# Patient Record
Sex: FEMALE | Race: BLACK OR AFRICAN AMERICAN | URBAN - METROPOLITAN AREA
[De-identification: names, ages, dates, MRNs, and addresses within clinical notes are randomized per-mention and may not be internally consistent; named-entity substitution may affect disease eponyms.]

---

## 2018-07-09 ENCOUNTER — HOSPITAL ENCOUNTER (EMERGENCY)
Facility: CLINIC | Age: 38
Discharge: HOME | End: 2018-07-09
Attending: PHYSICIAN ASSISTANT | Admitting: PHYSICIAN ASSISTANT
Payer: MEDICAID

## 2018-07-09 VITALS
WEIGHT: 167 LBS | HEART RATE: 69 BPM | SYSTOLIC BLOOD PRESSURE: 106 MMHG | OXYGEN SATURATION: 99 % | HEIGHT: 72 IN | TEMPERATURE: 98.1 F | DIASTOLIC BLOOD PRESSURE: 61 MMHG | BODY MASS INDEX: 22.62 KG/M2 | RESPIRATION RATE: 16 BRPM

## 2018-07-09 DIAGNOSIS — T16.1XXA FOREIGN BODY OF RIGHT EAR, INITIAL ENCOUNTER: ICD-10-CM

## 2018-07-09 PROCEDURE — 99282 EMERGENCY DEPT VISIT SF MDM: CPT

## 2018-07-09 NOTE — ED AVS SNAPSHOT
Emergency Department    [address] [facility] [address]    Phone:  [phone]    Fax:  [phone]                                       Chantal Mustafa   MRN: [id number]    Department:   Emergency Department   Date of Visit:  7/9/2018           Patient Information     Date Of Birth          1980        Your diagnoses for this visit were:     Foreign body of right ear, initial encounter        You were seen by Keshia Lee PA-C.      Follow-up Information     Follow up with Fitz Serrato MD.    Specialty:  Family Practice    Why:  As needed    Contact information:    [facility]  [address]  [facility] [address]  [phone]          Follow up with  Emergency Department.    Specialty:  EMERGENCY MEDICINE    Why:  As needed, If symptoms worsen    Contact information:    [address] [facility] [id number] [phone]        Discharge Instructions         Anatomy of the Ear    The ear is a complex and delicate organ. It collects sound waves so you can hear the world around you. The ear also has a second function--it helps you keep your balance. Your ear can be divided into 3 parts. The outer ear and middle ear help collect and amplify sound. The inner ear converts sound waves to messages that are sent to the brain. The inner ear also senses the movement and position of your head and body so you can maintain your balance and see clearly, even when you change positions.  The mastoid bone surrounds the middle ear. The external ear collects sound waves. The ear canal carries sound waves to the eardrum. The eardrum vibrates from sound waves, setting the middle ear bones in motion. The middle ear bones (ossicles) vibrate, transmitting sound waves to the inner ear. When the ear is healthy, air pressure remains balanced in the middle ear. The eustachian tube helps control air pressure in the middle ear. The semicircular canals help maintain  balance. The vestibular nerve carries balance signals to the brain. The auditory nerve carries sound signals to the brain. The cochlea picks up sound waves and makes nerve signals.     Date Last Reviewed: 10/1/2016    9961-0543 [employer]. [address] [place] [address], [place] [address]. All rights reserved. This information is not intended as a substitute for professional medical care. Always follow your healthcare professional's instructions.          24 Hour Appointment Hotline       To make an appointment at any [facility], call [phone] ([phone]). If you don't have a family doctor or clinic, we will help you find one. [facility] are conveniently located to serve the needs of you and your family.             Review of your medicines      Our records show that you are taking the medicines listed below. If these are incorrect, please call your family doctor or clinic.        Dose / Directions Last dose taken    fluticasone 50 MCG/ACT spray   Commonly known as:  FLONASE   Dose:  1-2 spray   Quantity:  1 Package        Spray 1-2 sprays into both nostrils daily   Refills:  0        IBUPROFEN PO        Refills:  0        LORATADINE PO        Refills:  0        vitamin B complex with vitamin C Tabs tablet   Dose:  1 tablet        Take 1 tablet by mouth daily   Refills:  0        VITAMIN C PO        Refills:  0                Orders Needing Specimen Collection     None      Pending Results     No orders found from 7/7/2018 to 7/10/2018.            Pending Culture Results     No orders found from 7/7/2018 to 7/10/2018.            Pending Results Instructions     If you had any lab results that were not finalized at the time of your Discharge, you can call the ED Lab Result RN at [phone]. You will be contacted by this team for any positive Lab results or changes in treatment. The nurses are available 7 days a week from 10A to 6:30P.  You can leave a message 24 hours per day and they  will return your call.        Test Results From Your Hospital Stay               Clinical Quality Measure: Blood Pressure Screening     Your blood pressure was checked while you were in the emergency department today. The last reading we obtained was  BP: 107/64 . Please read the guidelines below about what these numbers mean and what you should do about them.  If your systolic blood pressure (the top number) is less than 120 and your diastolic blood pressure (the bottom number) is less than 80, then your blood pressure is normal. There is nothing more that you need to do about it.  If your systolic blood pressure (the top number) is 120-139 or your diastolic blood pressure (the bottom number) is 80-89, your blood pressure may be higher than it should be. You should have your blood pressure rechecked within a year by a primary care provider.  If your systolic blood pressure (the top number) is 140 or greater or your diastolic blood pressure (the bottom number) is 90 or greater, you may have high blood pressure. High blood pressure is treatable, but if left untreated over time it can put you at risk for heart attack, stroke, or kidney failure. You should have your blood pressure rechecked by a primary care provider within the next 4 weeks.  If your provider in the emergency department today gave you specific instructions to follow-up with your doctor or provider even sooner than that, you should follow that instruction and not wait for up to 4 weeks for your follow-up visit.        Thank you for choosing [facility]       Thank you for choosing [facility] for your care. Our goal is always to provide you with excellent care. Hearing back from our patients is one way we can continue to improve our services. Please take a few minutes to complete the written survey that you may receive in the mail after you visit with us. Thank you!        [facility] Information     [employer] lets you send messages to your doctor, view your test  "results, renew your prescriptions, schedule appointments and more. To sign up, go to [email or web address] . Click on \"Log in\" on the left side of the screen, which will take you to the Welcome page. Then click on \"Sign up Now\" on the right side of the page.     You will be asked to enter the access code listed below, as well as some personal information. Please follow the directions to create your username and password.     Your access code is: [email or web address]  Expires: 10/7/2018 10:23 PM     Your access code will  in 90 days. If you need help or a new code, please call your [facility] clinic or [phone].        Care EveryWhere ID     This is your Care EveryWhere ID. This could be used by other organizations to access your [facility] medical records  [id number]        Equal Access to Services     KAROL DEL TORO : Nat Sam, zoie terry, haylie barrios, tello davis . So [facility] [phone].    ATENCIÓN: Si habla español, tiene a villatoro disposición servicios gratuitos de asistencia lingüística. Llame al [phone].    We comply with applicable federal civil rights laws and [place] laws. We do not discriminate on the basis of race, color, national origin, age, disability, sex, sexual orientation, or gender identity.            After Visit Summary       This is your record. Keep this with you and show to your community pharmacist(s) and doctor(s) at your next visit.                  "

## 2018-07-09 NOTE — ED AVS SNAPSHOT
Emergency Department    [address] [facility] [address]    Phone:  [phone]    Fax:  [phone]                                       Chantal Mustafa   MRN: [id number]    Department:   Emergency Department   Date of Visit:  7/9/2018           After Visit Summary Signature Page     I have received my discharge instructions, and my questions have been answered. I have discussed any challenges I see with this plan with the nurse or doctor.    ..........................................................................................................................................  Patient/Patient Representative Signature      ..........................................................................................................................................  Patient Representative Print Name and Relationship to Patient    ..................................................               ................................................  Date                                            Time    ..........................................................................................................................................  Reviewed by Signature/Title    ...................................................              ..............................................  Date                                                            Time

## 2018-07-10 NOTE — ED NOTES
Pt's right ear irrigated by EDT.  Water flowed freely, no debris appeared to be present in the ear.

## 2018-07-10 NOTE — ED NOTES
Bed: FT04  Expected date:   Expected time:   Means of arrival:   Comments:  Foreign body in ear (qtip)\

## 2018-07-10 NOTE — ED PROVIDER NOTES
History     Chief Complaint:  Foreign Body in Ear (end of q-tip in right ear x 1 hour. )       HECTOR Mustafa is a 38 year old female who presents with concern for a foreign body in her right ear.  Patient reports prior to arrival she is using a Q-tip to clean her ear and the cotton came off the Q-tip and she suspects that it is left in her ear canal.  She denies hearing loss and no drainage from the ear.  She voices no other areas of concern..      Allergies:  Aspirin  Penicillins     Medications:      Ascorbic Acid (VITAMIN C PO)   fluticasone (FLONASE) 50 MCG/ACT nasal spray   IBUPROFEN PO   LORATADINE PO   vitamin  B complex with vitamin C (VITAMIN  B COMPLEX) TABS       Past Medical History:    History reviewed. No pertinent past medical history.    There are no active problems to display for this patient.       Past Surgical History:    History reviewed. No pertinent surgical history.     Family History:    family history is not on file.     Social History:   reports that she has never smoked. She does not have any smokeless tobacco history on file. She reports that she does not drink alcohol or use illicit drugs.    PCP: Fitz Serrato     Review of Systems   HENT: Negative for ear discharge, ear pain and hearing loss.      Physical Exam     Patient Vitals for the past 24 hrs:   BP Temp Temp src Pulse Resp SpO2 Height Weight   07/09/18 2147 107/64 98.1  F (36.7  C) Oral 69 16 98 % 1.829 m (6') 75.8 kg (167 lb)        General: Alert, interactive in no distress.   Head:  Scalp is atraumatic.  Eyes:  EOM intact. The pupils are equal, round, and reactive to light. No scleral icterus.  ENT:                                      Ears:  The external ears are normal. Bilateral TM's non-erythematous, non-bulging. External canals normal with no evidence of foreign body.   Nose:  The external nose is normal.  Throat:  The oropharynx is normal. Mucus membranes are moist.                 Skin:  Warm and dry, No rash  or lesions noted.  Psych:  Awake. Alert. Appropriate interactions.      Emergency Department Course     Emergency Department Course:  Past medical records, nursing notes, and vitals reviewed.  10:02 PM: I performed an exam of the patient and obtained history, as documented above.     Findings and plan explained to the patient. Patient discharged home with instructions regarding supportive care, medications, and reasons to return. The importance of close follow-up was reviewed.      Impression & Plan      Medical Decision Making:  Chantal Mustafa is a 38 year old female who presents with concern of foreign body in the right ear.  Patient was concerned when she was cleaning out her ears and the Q-tip was missing the cotton tip and she suspected this was in her right ear.  On exam, there is no evidence of foreign body in the right ear.  Patient was convinced that there was indeed cotton in her ear as she did not know where else it would have gone.  The ED tech irrigated the ear and there is no evidence of foreign body.  Patient was reassured and all questions/concerns addressed prior to discharge home.    Diagnosis:    ICD-10-CM    1. Foreign body of right ear, initial encounter T16.1XXA         Disposition:   Discharge to home         7/9/2018   Keshia Lee PA-C  Supervising provider, Keshia Lee PA-C Luell, Amy Jolene, PA-C  07/09/18 2224

## 2018-07-10 NOTE — DISCHARGE INSTRUCTIONS
Anatomy of the Ear    The ear is a complex and delicate organ. It collects sound waves so you can hear the world around you. The ear also has a second function--it helps you keep your balance. Your ear can be divided into 3 parts. The outer ear and middle ear help collect and amplify sound. The inner ear converts sound waves to messages that are sent to the brain. The inner ear also senses the movement and position of your head and body so you can maintain your balance and see clearly, even when you change positions.  The mastoid bone surrounds the middle ear. The external ear collects sound waves. The ear canal carries sound waves to the eardrum. The eardrum vibrates from sound waves, setting the middle ear bones in motion. The middle ear bones (ossicles) vibrate, transmitting sound waves to the inner ear. When the ear is healthy, air pressure remains balanced in the middle ear. The eustachian tube helps control air pressure in the middle ear. The semicircular canals help maintain balance. The vestibular nerve carries balance signals to the brain. The auditory nerve carries sound signals to the brain. The cochlea picks up sound waves and makes nerve signals.     Date Last Reviewed: 10/1/2016    [id number] [employer]. [address] [place] [address], [place] [address]. All rights reserved. This information is not intended as a substitute for professional medical care. Always follow your healthcare professional's instructions.